# Patient Record
(demographics unavailable — no encounter records)

---

## 2024-12-13 NOTE — HISTORY OF PRESENT ILLNESS
[FreeTextEntry1] : Referred by (GYN) Dr. Ayoub   Ms. ALANIZ is a 78 year old, referred from Dr. Ayoub, for a pelvic mass.  She presented to the GYN with pelvic discomfort.  Workup included pelvic sonogram and MRI.  CA-125 was 7 in 10/2024.  Prior to this she has not had regular gynecologic care.  10/1/2024- TVUS- Uterus- 5.6 x 2.5 x 4.2 cm, no discrete fibroids, EMS- 2 mm, Right Ovary measures 3.6 x 1.9 x 2.2 cm, there is a 2.5 x 1.4 x 1.8 cm questionable isoechoic solid structure within right ovary.  Left ovary not visualized  11/5/2024- MRI   Uterus- 2.8 x 4.8 x 6.1 cm, with 2 subcentimeter intramural uterine fibroids with diffuse enhancement to the right body and fundus   EMS- 2 mm   Right Ovary with a 2 x 3.1 x 2.5 cm cyst with mild peripheral enhancement   Left Ovary is not well seen, in the left kelly adnexal region, there is a hypointense Non enhancing structures 1.3 cm lesion, 2 x 1.3 cm lesion with central fat, 2.3 x 1.3 cm lesion with central fat   There are multiple sub centimeter, hyperintensities along the posterior aspect of the uterus   No enlarged LNs   Of note, she was recently diagnosed with DCIS in 2 areas on the breast.  She saw Dr. Guerrero with plan for surgery, pending evaluation of the ovarian cyst.  She denies abdominal bloating/distention.  She denies a change in appetite, or a change in weight.  She is tolerating PO without nausea or vomiting.  She denies any change in bowel or bladder habits.  She denies any other associated signs or symptoms.  She is here to discuss further surgical management.   HM Pap- 9/ 2024- negative, HRHPV (-) Colonoscopy- 2019

## 2024-12-13 NOTE — DISCUSSION/SUMMARY
[FreeTextEntry1] : 79 yo with recently diagnosed DCIS, presenting with bilateral adnexal cysts and pelvic pain.   I discussed my recommendations with Ms. King and her daughter who provided translation.  MRI features are not suspicious for malignancy and CA-125 is normal.  I discussed that ovarian malignancy can only be definitively diagnosed after excision, however clinical workup thus far is not suspicious.  Options include surgical excision (L/S BSO) vs continued observation.  Risks/benefits of each option discussed in detail.  I also discussed role of hysterectomy at time of BSO and its associated risks/benefits.  With recent diagnosis of DCIS she will follow up with Dr. Guerrero regarding timing of surgical scheduling of lumpectomy.    She will call office with her decision regarding management of the adnexal cysts.

## 2024-12-13 NOTE — PHYSICAL EXAM
[Chaperone Present] : A chaperone was present in the examining room during all aspects of the physical examination [[id number]] : A chaperone was present during the pelvic exam. [Normal] : Recto-Vaginal Exam: Normal [FreeTextEntry2] : Riky

## 2024-12-13 NOTE — OB HISTORY
[Total Preg ___] : : [unfilled] [Abortions ___] : [unfilled] (abortions) [Living ___] : [unfilled] (living) [Vaginal ___] : [unfilled] vaginal delivery(s) [unknown] : the patient is unsure of the date of her LMP